# Patient Record
Sex: MALE
[De-identification: names, ages, dates, MRNs, and addresses within clinical notes are randomized per-mention and may not be internally consistent; named-entity substitution may affect disease eponyms.]

---

## 2018-11-29 ENCOUNTER — HOSPITAL ENCOUNTER (EMERGENCY)
Dept: HOSPITAL 42 - ED | Age: 23
Discharge: HOME | End: 2018-11-29
Payer: MEDICAID

## 2018-11-29 VITALS
OXYGEN SATURATION: 100 % | HEART RATE: 75 BPM | DIASTOLIC BLOOD PRESSURE: 78 MMHG | SYSTOLIC BLOOD PRESSURE: 131 MMHG | TEMPERATURE: 99.8 F

## 2018-11-29 VITALS — BODY MASS INDEX: 26.7 KG/M2

## 2018-11-29 VITALS — RESPIRATION RATE: 18 BRPM

## 2018-11-29 DIAGNOSIS — K52.9: Primary | ICD-10-CM

## 2018-11-29 LAB
ALBUMIN SERPL-MCNC: 4.6 G/DL (ref 3–4.8)
ALBUMIN/GLOB SERPL: 1.1 {RATIO} (ref 1.1–1.8)
ALT SERPL-CCNC: 37 U/L (ref 7–56)
AST SERPL-CCNC: 29 U/L (ref 17–59)
BASE EXCESS BLDV CALC-SCNC: 5.1 MMOL/L (ref 0–2)
BUN SERPL-MCNC: 15 MG/DL (ref 7–21)
CALCIUM SERPL-MCNC: 9.4 MG/DL (ref 8.4–10.5)
ERYTHROCYTE [DISTWIDTH] IN BLOOD BY AUTOMATED COUNT: 12.6 % (ref 11.5–14.5)
GFR NON-AFRICAN AMERICAN: > 60
HGB BLD-MCNC: 14.9 G/DL (ref 14–18)
LIPASE SERPL-CCNC: 30 U/L (ref 23–300)
MCH RBC QN AUTO: 28.1 PG (ref 25–35)
MCHC RBC AUTO-ENTMCNC: 35 G/DL (ref 31–37)
MCV RBC AUTO: 80.2 FL (ref 80–105)
PH BLDV: 7.42 [PH] (ref 7.32–7.43)
PLATELET # BLD: 254 10^3/UL (ref 120–450)
PMV BLD AUTO: 9 FL (ref 7–11)
RBC # BLD AUTO: 5.31 10^6/UL (ref 3.5–6.1)
VENOUS BLOOD FIO2: 21 %
VENOUS BLOOD GAS PCO2: 47 (ref 40–60)
VENOUS BLOOD GAS PO2: 38 MM/HG (ref 30–55)
WBC # BLD AUTO: 5.8 10^3/UL (ref 4.5–11)

## 2018-11-29 PROCEDURE — 80053 COMPREHEN METABOLIC PANEL: CPT

## 2018-11-29 PROCEDURE — 85027 COMPLETE CBC AUTOMATED: CPT

## 2018-11-29 PROCEDURE — 99284 EMERGENCY DEPT VISIT MOD MDM: CPT

## 2018-11-29 PROCEDURE — 96374 THER/PROPH/DIAG INJ IV PUSH: CPT

## 2018-11-29 PROCEDURE — 83690 ASSAY OF LIPASE: CPT

## 2018-11-29 PROCEDURE — 82803 BLOOD GASES ANY COMBINATION: CPT

## 2018-11-29 NOTE — ED PDOC
Arrival/HPI





- General


Chief Complaint: GI Problem


Time Seen by Provider: 11/29/18 02:07


Historian: Patient





- History of Present Illness


Narrative History of Present Illness (Text): 


11/29/18 02:18


Eleazar Evans is a 22 year old male who presents to the Emergency department 

complaining of nausea and vomiting. Patient states he has been experiencing 

multiple episodes of diarrhea for the past 2 days with nausea and 4 episodes of 

vomiting today. Patient reports some associated abdominal cramping and fever at 

home. Patient states he took medication at home, but then vomited it up. Patient

notes he is currently on Augmentin for strep throat. Patient denies any chest 

pain, shortness of breath, urinary symptoms, back pain, neck pain, headache, 

dizziness, or any other complaints.





Symptom Onset: Gradual


Symptom Course: Unchanged


Activities at Onset: Light


Context: Home





Past Medical History





- Provider Review


Nursing Documentation Reviewed: Yes





- Cardiac


Other/Comment: hx of "irregular heart beat"





- Pulmonary


Other/Comment: strep throat





- Neurological


Hx Neurological Disorder: No





- HEENT


Hx HEENT Disorder: No


Other/Comment: hx lasik sx





- Renal


Hx Renal Disorder: No





- Endocrine/Metabolic


Hx Endocrine Disorders: No





- Hematological/Oncological


Hx Blood Disorders: No





- Integumentary


Hx Dermatological Disorder: No





- Musculoskeletal/Rheumatological


Hx Musculoskeletal Disorders: No





- Gastrointestinal


Hx Gastrointestinal Disorders: No





- Genitourinary/Gynecological


Hx Genitourinary Disorders: No





- Psychiatric


Hx Psychophysiologic Disorder: No


Hx Substance Use: No





- Surgical History


Other/Comment: lasik sx





- Anesthesia


Hx Anesthesia: No


Hx Anesthesia Reactions: No


Hx Malignant Hyperthermia: No





Family/Social History





- Physician Review


Nursing Documentation Reviewed: Yes


Family/Social History: Unknown Family HX


Smoking Status: Never Smoked


Hx Alcohol Use: No


Hx Substance Use: No





Allergies/Home Meds


Allergies/Adverse Reactions: 


Allergies





No Known Allergies Allergy (Verified 11/29/18 02:12)


   








Home Medications: 


                                    Home Meds











 Medication  Instructions  Recorded  Confirmed


 


Amoxicillin/Potassium Clav 1 tab PO DAILY 11/29/18 11/29/18





[Amox-Clav 875-125 mg Tablet]   














Review of Systems





- Physician Review


All systems were reviewed & negative as marked: Yes





- Review of Systems


Constitutional: Fevers


Eyes: Normal


ENT: Normal


Respiratory: Normal.  absent: SOB, Cough


Cardiovascular: Normal.  absent: Chest Pain


Gastrointestinal: Diarrhea, Nausea, Vomiting


Genitourinary Male: Normal.  absent: Dysuria, Frequency, Hematuria, Urinary 

Output Changes


Musculoskeletal: Normal.  absent: Back Pain, Neck Pain


Skin: Normal.  absent: Rash


Neurological: Normal.  absent: Headache, Dizziness


Endocrine: Normal


Hemo/Lymphatic: Normal


Psychiatric: Normal





Physical Exam


Vital Signs Reviewed: Yes





Vital Signs











  Temp Pulse Resp BP Pulse Ox


 


 11/29/18 02:03  102 F H  111 H  18  117/61  97











Temperature: Febrile


Blood Pressure: Normal


Pulse: Regular


Respiratory Rate: Normal


Appearance: Positive for: Well-Appearing, Non-Toxic, Comfortable


Pain Distress: None


Mental Status: Positive for: Alert and Oriented X 3





- Systems Exam


Head: Present: Atraumatic, Normocephalic


Pupils: Present: PERRL


Extroacular Muscles: Present: EOMI


Conjunctiva: Present: Normal


Ears: Present: Normal, NORMAL TM, Normal Canal.  No: Erythema, TM Bulging, TM 

Perf


Mouth: Present: Moist Mucous Membranes


Pharnyx: Present: ERYTHEMA (Minimal erythema to posterior pharynx).  No: 

EXUDATE, TONSILS ENLARGED, Peritonsilar Swelling, Uvular Deviation, 

Muffled/Hoarse Voice, Strider, Soft Palate/Uvular Edema


Nose (External): Present: Atraumatic


Nose (Internal): Present: Normal Inspection


Neck: Present: Normal Range of Motion.  No: Meningeal Signs, MIDLINE TENDERNESS,

Paraspinal Tenderness


Respiratory/Chest: Present: Clear to Auscultation, Good Air Exchange.  No: 

Respiratory Distress, Accessory Muscle Use


Cardiovascular: Present: Regular Rate and Rhythm, Normal S1, S2.  No: Murmurs


Abdomen: No: Tenderness, Distention, Peritoneal Signs


Back: Present: Normal Inspection.  No: CVA Tenderness, Midline Tenderness, 

Paraspinal Tenderness


Upper Extremity: Present: Normal Inspection.  No: Cyanosis, Edema


Lower Extremity: Present: Normal Inspection.  No: Edema


Neurological: Present: GCS=15, CN II-XII Intact, Speech Normal


Skin: Present: Warm, Dry, Normal Color.  No: Rashes


Psychiatric: Present: Alert, Oriented x 3, Normal Insight, Normal Concentration





Medical Decision Making


ED Course and Treatment: 


11/29/18 02:18


Impression:


22 year old male complaining of nausea, vomiting, diarrhea, abdominal cramping, 

and fever.





Plan:


-- Labs, VBG


-- IV fluids


-- Zofran


-- Tylenol


-- Reassess and disposition





Progress Notes:








- PA / NP / Resident Statement


MD/ has reviewed & agrees with the documentation as recorded.


MD/ has examined the patient and agrees with the treatment plan.





- Scribe Statement


The provider has reviewed the documentation as recorded by the Theo Wagoner





Provider Scribe Attestation:


All medical record entries made by the Scribe were at my direction and 

personally dictated by me. I have reviewed the chart and agree that the record 

accurately reflects my personal performance of the history, physical exam, 

medical decision making, and the department course for this patient. I have also

personally directed, reviewed, and agree with the discharge instructions and 

disposition.








Disposition/Present on Arrival





- Present on Arrival


Any Indicators Present on Arrival: No


History of DVT/PE: No


History of Uncontrolled Diabetes: No


Urinary Catheter: No


History of Decub. Ulcer: No


History Surgical Site Infection Following: None





- Disposition


Have Diagnosis and Disposition been Completed?: Yes


Diagnosis: 


 Gastroenteritis





Disposition: HOME/ ROUTINE


Disposition Time: 06:02


Patient Plan: Discharge


Condition: GOOD


Discharge Instructions (ExitCare):  Gastroenteritis (ED)


Additional Instructions: 


Drink frequent small amounts of liquids/may add Gatorade/advance to bland diet 

as tolerated/take meds as prescribed/follow up with your doctor


Prescriptions: 


Ondansetron ODT [Zofran ODT] 4 mg PO Q6 PRN #12 odt


 PRN Reason: Nausea/Vomiting


Referrals: 


Kateryna Grigsby MD [Primary Care Provider] - Follow up with primary


Forms:  [employer] (English), WORK NOTE

## 2019-02-22 ENCOUNTER — HOSPITAL ENCOUNTER (EMERGENCY)
Dept: HOSPITAL 42 - ED | Age: 24
Discharge: HOME | End: 2019-02-22
Payer: MEDICAID

## 2019-02-22 VITALS
OXYGEN SATURATION: 99 % | HEART RATE: 122 BPM | SYSTOLIC BLOOD PRESSURE: 106 MMHG | RESPIRATION RATE: 20 BRPM | DIASTOLIC BLOOD PRESSURE: 64 MMHG

## 2019-02-22 VITALS — TEMPERATURE: 100.8 F

## 2019-02-22 VITALS — BODY MASS INDEX: 26.7 KG/M2

## 2019-02-22 DIAGNOSIS — E87.6: Primary | ICD-10-CM

## 2019-02-22 DIAGNOSIS — R50.9: ICD-10-CM

## 2019-02-22 DIAGNOSIS — B34.9: ICD-10-CM

## 2019-02-22 DIAGNOSIS — K52.9: ICD-10-CM

## 2019-02-22 DIAGNOSIS — R00.0: ICD-10-CM

## 2019-02-22 DIAGNOSIS — R11.2: ICD-10-CM

## 2019-02-22 LAB
ALBUMIN SERPL-MCNC: 4.7 G/DL (ref 3–4.8)
ALBUMIN/GLOB SERPL: 1.2 {RATIO} (ref 1.1–1.8)
ALT SERPL-CCNC: 27 U/L (ref 7–56)
APPEARANCE UR: CLEAR
AST SERPL-CCNC: 34 U/L (ref 17–59)
BACTERIA #/AREA URNS HPF: (no result) /HPF
BASOPHILS # BLD AUTO: 0 K/MM3 (ref 0–2)
BASOPHILS NFR BLD: 0 % (ref 0–3)
BILIRUB UR-MCNC: NEGATIVE MG/DL
BUN SERPL-MCNC: 20 MG/DL (ref 7–21)
CALCIUM SERPL-MCNC: 9.4 MG/DL (ref 8.4–10.5)
COLOR UR: YELLOW
EOSINOPHIL # BLD: 0.1 10*3/UL (ref 0–0.7)
EOSINOPHIL NFR BLD: 0.7 % (ref 1.5–5)
EPI CELLS #/AREA URNS HPF: (no result) /HPF (ref 0–5)
ERYTHROCYTE [DISTWIDTH] IN BLOOD BY AUTOMATED COUNT: 12.7 % (ref 11.5–14.5)
GFR NON-AFRICAN AMERICAN: > 60
GLUCOSE UR STRIP-MCNC: NEGATIVE MG/DL
HGB BLD-MCNC: 15.3 G/DL (ref 14–18)
LEUKOCYTE ESTERASE UR-ACNC: NEGATIVE LEU/UL
LIPASE SERPL-CCNC: 31 U/L (ref 23–300)
LYMPHOCYTES # BLD: 1.1 10*3/UL (ref 1.2–3.4)
LYMPHOCYTES NFR BLD AUTO: 14.8 % (ref 22–35)
MCH RBC QN AUTO: 28 PG (ref 25–35)
MCHC RBC AUTO-ENTMCNC: 34.5 G/DL (ref 31–37)
MCV RBC AUTO: 81.2 FL (ref 80–105)
MONOCYTES # BLD AUTO: 0.3 10*3/UL (ref 0.1–0.6)
MONOCYTES NFR BLD: 4 % (ref 1–6)
PH UR STRIP: 7 [PH] (ref 4.7–8)
PLATELET # BLD: 244 10^3/UL (ref 120–450)
PMV BLD AUTO: 9.8 FL (ref 7–11)
PROT UR STRIP-MCNC: (no result) MG/DL
RBC # BLD AUTO: 5.47 10^6/UL (ref 3.5–6.1)
RBC # UR STRIP: NEGATIVE /UL
RBC #/AREA URNS HPF: (no result) /HPF (ref 0–2)
SP GR UR STRIP: 1.02 (ref 1–1.03)
UROBILINOGEN UR STRIP-ACNC: 1 E.U./DL
WBC # BLD AUTO: 7.5 10^3/UL (ref 4.5–11)

## 2019-02-22 PROCEDURE — 87804 INFLUENZA ASSAY W/OPTIC: CPT

## 2019-02-22 PROCEDURE — 96376 TX/PRO/DX INJ SAME DRUG ADON: CPT

## 2019-02-22 PROCEDURE — 83735 ASSAY OF MAGNESIUM: CPT

## 2019-02-22 PROCEDURE — 99284 EMERGENCY DEPT VISIT MOD MDM: CPT

## 2019-02-22 PROCEDURE — 96375 TX/PRO/DX INJ NEW DRUG ADDON: CPT

## 2019-02-22 PROCEDURE — 74176 CT ABD & PELVIS W/O CONTRAST: CPT

## 2019-02-22 PROCEDURE — 83690 ASSAY OF LIPASE: CPT

## 2019-02-22 PROCEDURE — 85025 COMPLETE CBC W/AUTO DIFF WBC: CPT

## 2019-02-22 PROCEDURE — 96374 THER/PROPH/DIAG INJ IV PUSH: CPT

## 2019-02-22 PROCEDURE — 80053 COMPREHEN METABOLIC PANEL: CPT

## 2019-02-22 PROCEDURE — 81001 URINALYSIS AUTO W/SCOPE: CPT

## 2019-02-22 NOTE — ED PDOC
Arrival/HPI





- General


Chief Complaint: GI Problem


Time Seen by Provider: 02/22/19 18:30


Historian: Patient, Parent





- History of Present Illness


Time/Duration: Other (Last night)


Symptom Onset: Gradual


Symptom Course: Unchanged


Severity Level: Moderate


Activities at Onset: Rest


Associated Symptoms (Text): 





02/22/19 18:38


Patient complains of abdominal pain nausea vomiting and fever since last night. 

No diarrhea.  His abdominal pain has since resolved.  He has not been able to 

tolerate any p.o.  No travel or exposure.  No injury or trauma.  He has had 

multiple similar previous episodes.  Here he appears dry dehydrated and 

uncomfortable..





Past Medical History





- Cardiac


Hx Cardiac Disorders: Yes


Hx Heart Murmur: Yes





- Pulmonary


Other/Comment: strep throat





- Neurological


Hx Neurological Disorder: No





- HEENT


Hx HEENT Disorder: No


Other/Comment: hx lasik sx





- Renal


Hx Renal Disorder: No





- Endocrine/Metabolic


Hx Endocrine Disorders: No





- Hematological/Oncological


Hx Blood Disorders: No





- Integumentary


Hx Dermatological Disorder: No





- Musculoskeletal/Rheumatological


Hx Musculoskeletal Disorders: No





- Gastrointestinal


Hx Gastrointestinal Disorders: No





- Genitourinary/Gynecological


Hx Genitourinary Disorders: No





- Psychiatric


Hx Psychophysiologic Disorder: No


Hx Substance Use: No





- Surgical History


Hx Eye Surgery: Yes





- Anesthesia


Hx Anesthesia: No


Hx Anesthesia Reactions: No


Hx Malignant Hyperthermia: No





Family/Social History





- Physician Review


Nursing Documentation Reviewed: Yes


Family/Social History: Unknown Family HX


Smoking Status: Never Smoked


Hx Alcohol Use: No


Hx Substance Use: No





Allergies/Home Meds


Allergies/Adverse Reactions: 


Allergies





No Known Allergies Allergy (Verified 02/22/19 18:23)


   








Home Medications: 


                                    Home Meds











 Medication  Instructions  Recorded  Confirmed


 


Ibuprofen [Motrin Tab] 500 mg PO PRN PRN 02/22/19 02/22/19














Review of Systems





- Physician Review


All systems were reviewed & negative as marked: Yes





- Review of Systems


Constitutional: Fatigue, Fevers


Respiratory: absent: SOB, Cough, Sputum, Wheezing


Cardiovascular: absent: Chest Pain, Palpitations, Syncope


Gastrointestinal: Abdominal Pain, Nausea, Vomiting, Anorexia.  absent: Constipat

ion, Diarrhea


Genitourinary Male: absent: Dysuria, Frequency, Hematuria


Neurological: absent: Headache, Dizziness, Focal Weakness





Physical Exam





Vital Signs











  Temp Pulse Resp BP Pulse Ox


 


 02/22/19 18:25  101.1 F H  122 H  20  106/64  99











Temperature: Febrile


Blood Pressure: Normal


Pulse: Tachycardic


Respiratory Rate: Normal


Appearance: Positive for: Well-Appearing, Non-Toxic, Comfortable, Uncomfortable


Pain Distress: None


Mental Status: Positive for: Alert and Oriented X 3





- Systems Exam


Head: Present: Atraumatic, Normocephalic


Pupils: Present: PERRL


Extroacular Muscles: Present: EOMI


Conjunctiva: Present: Normal


Ears: Present: NORMAL TM, Normal Canal.  No: Erythema, TM Bulging


Mouth: Present: Dry


Pharnyx: No: ERYTHEMA, EXUDATE, TONSILS ENLARGED


Neck: Present: Normal Range of Motion


Respiratory/Chest: Present: Clear to Auscultation, Good Air Exchange.  No: 

Respiratory Distress, Accessory Muscle Use


Cardiovascular: Present: Normal S1, S2, Tachycardic.  No: Murmurs


Abdomen: No: Tenderness, Distention, Peritoneal Signs, Rebound, Guarding


Back: Present: Normal Inspection.  No: CVA Tenderness, Midline Tenderness, 

Paraspinal Tenderness


Upper Extremity: Present: Normal Inspection.  No: Cyanosis, Edema


Lower Extremity: Present: Normal Inspection.  No: Edema


Neurological: Present: GCS=15, CN II-XII Intact, Speech Normal, Motor Func 

Grossly Intact


Skin: Present: Warm, Dry, Normal Color.  No: Rashes


Psychiatric: Present: Alert, Oriented x 3, Normal Insight, Normal Concentration





Medical Decision Making


ED Course and Treatment: 

















EXAM: CT Abdomen and Pelvis without IV contrast 


Electronically signed on Feb 22, 2019 8:02:12 PM EST by:


Raul Quispe M.D.


IMPRESSION: 


1.  Findings suspicious for some acute/chronic cholecystitis.  Consideration 

could be given to correlation with gallbladder ultrasound evaluation. 


2.  An age indeterminate oblique fracture is seen in the anterolateral aspect of

the right acetabulum.











02/22/19 20:41


Blood work is unremarkable.  Urinalysis is unremarkable.  Patient has been able 

to tolerate p.o. potassium in the emergency department.  His CT scan is 

suspicious for acute on chronic cholecystitis.  His liver enzymes and bilirubin 

are normal.  He will be discharged home accompanied by his mother to follow-up 

with PMD for an outpatient gallbladder ultrasound.





- RAD Interpretation


Radiology Orders: 











02/22/19 18:35


ABD & PELVIS W/O PO OR IV CONT [CT] Stat 














- Medication Orders


Current Medication Orders: 











Acetaminophen (Tylenol 650 Mg Supp)  650 mg RC STAT STA


   Stop: 02/22/19 18:37


Sodium Chloride (Sodium Chloride 0.9%)  1,000 mls @ 1,000 mls/hr IV .Q1H STA


   Stop: 02/22/19 19:34


Ondansetron HCl (Zofran Inj)  4 mg IVP STAT STA


   Stop: 02/22/19 18:36


Pantoprazole Sodium (Protonix Inj)  40 mg IVP STAT STA


   Stop: 02/22/19 18:36











Disposition/Present on Arrival





- Present on Arrival


Any Indicators Present on Arrival: No


History of DVT/PE: No


History of Uncontrolled Diabetes: No


Urinary Catheter: No


History of Decub. Ulcer: No


History Surgical Site Infection Following: None





- Disposition


Have Diagnosis and Disposition been Completed?: Yes


Diagnosis: 


 Hypokalemia, Abdominal pain, Fever, Nausea and vomiting, Gastroenteritis, 

Tachycardia, Viral illness





Disposition: HOME/ ROUTINE


Disposition Time: 20:44


Patient Plan: Discharge


Condition: IMPROVED


Discharge Instructions (ExitCare):  Viral Gastroenteritis, Hypokalemia, 

Tachycardia, Nausea and Vomiting, Adult (DC), Viral Gastroenteritis, Adult (DC),

 Fever, Adult (DC)


Additional Instructions: 


Clear liquids for 24 hours.  Symptomatic treatment.  Tylenol or Advil as 

directed on bottle as needed.  Follow-up with PMD.  Follow-up in the ER as 

needed.


Prescriptions: 


Ondansetron ODT [Zofran ODT] 4 mg PO Q6 #20 odt


Referrals: 


Kateryna Grigsby MD [Primary Care Provider] - Follow up with primary


Forms:  [email or web address] (English), WORK NOTE

## 2019-02-23 NOTE — CT
Date of service: 



PROCEDURE:  CT Abdomen and Pelvis without intravenous contrast



HISTORY:

Abdominal pain, nausea and vomiting 



COMPARISON:

None.



TECHNIQUE:

CT scan of the abdomen and pelvis was performed without 

administration of intravenous contrast.  Oral contrast was not 

administered.  Coronal and sagittal reformatted images were obtained. 



Radiation dose:



Total exam DLP = 343.02 mGy-cm.



This CT exam was performed using one or more of the following dose 

reduction techniques: Automated exposure control, adjustment of the 

mA and/or kV according to patient size, and/or use of iterative 

reconstruction technique.



FINDINGS:



LOWER THORAX:

The visualized lungs are clear. 



LIVER:

Normal in size.  No gross lesion or ductal dilatation. 



GALLBLADDER AND BILE DUCTS:

Well distended. No calcified gallstones. No common bile duct 

dilatation. 



PANCREAS:

Normal in size. No gross lesion or ductal dilatation.



SPLEEN:

Normal in size. 



ADRENALS:

Normal in size. No discrete nodule. 



KIDNEYS AND URETERS:

Both kidneys are normal in size. No hydronephrosis or 

nephrolithiasis. 



VASCULATURE:

Normal in caliber. No aortic aneurysm. No aortic atherosclerotic 

calcification or mural plaque present.



BOWEL:

Evaluation of the bowel is limited in the absence of oral contrast.  

The small bowel loops are normal in caliber. The colon is normal in 

size. No bowel dilatation or wall thickening. No bowel obstruction. 



APPENDIX:

Normal appendix. 



PERITONEUM:

No free fluid. No free air. 



LYMPH NODES:

No enlarged lymph nodes. 



BLADDER:

Decompressed. 



REPRODUCTIVE:

The prostate gland is normal in size. 



BONES:

No acute fracture.  There is sharp lucency in the right posterior 

superior acetabulum and iliac bone which may represent an old 

nonunited fracture deformity or postsurgical changes. 



OTHER FINDINGS:

None.



IMPRESSION:

No acute abdominal or pelvic abnormality.



A preliminary report was provided by [facility].. 



The final report is tagged to the PA review folder.

## 2020-05-11 ENCOUNTER — HOSPITAL ENCOUNTER (EMERGENCY)
Age: 25
Discharge: HOME | End: 2020-05-11
Attending: EMERGENCY MEDICINE
Payer: SELF-PAY

## 2020-05-11 ENCOUNTER — APPOINTMENT (OUTPATIENT)
Dept: CT IMAGING | Age: 25
End: 2020-05-11
Attending: EMERGENCY MEDICINE
Payer: SELF-PAY

## 2020-05-11 VITALS
TEMPERATURE: 98.3 F | HEART RATE: 72 BPM | RESPIRATION RATE: 16 BRPM | HEIGHT: 68 IN | OXYGEN SATURATION: 98 % | BODY MASS INDEX: 29.5 KG/M2 | DIASTOLIC BLOOD PRESSURE: 75 MMHG | SYSTOLIC BLOOD PRESSURE: 110 MMHG | WEIGHT: 194.67 LBS

## 2020-05-11 DIAGNOSIS — K63.89 EPIPLOIC APPENDAGITIS: Primary | ICD-10-CM

## 2020-05-11 LAB
ALBUMIN SERPL-MCNC: 3.9 G/DL (ref 3.5–5)
ALBUMIN/GLOB SERPL: 0.9 {RATIO} (ref 1.1–2.2)
ALP SERPL-CCNC: 67 U/L (ref 45–117)
ALT SERPL-CCNC: 68 U/L (ref 12–78)
ANION GAP SERPL CALC-SCNC: 10 MMOL/L (ref 5–15)
APPEARANCE UR: CLEAR
AST SERPL-CCNC: 27 U/L (ref 15–37)
BACTERIA URNS QL MICRO: NEGATIVE /HPF
BASOPHILS # BLD: 0 K/UL (ref 0–0.1)
BASOPHILS NFR BLD: 0 % (ref 0–1)
BILIRUB SERPL-MCNC: 0.5 MG/DL (ref 0.2–1)
BILIRUB UR QL: NEGATIVE
BUN SERPL-MCNC: 17 MG/DL (ref 6–20)
BUN/CREAT SERPL: 19 (ref 12–20)
CALCIUM SERPL-MCNC: 9.5 MG/DL (ref 8.5–10.1)
CHLORIDE SERPL-SCNC: 102 MMOL/L (ref 97–108)
CO2 SERPL-SCNC: 29 MMOL/L (ref 21–32)
COLOR UR: ABNORMAL
CREAT SERPL-MCNC: 0.9 MG/DL (ref 0.7–1.3)
DIFFERENTIAL METHOD BLD: NORMAL
EOSINOPHIL # BLD: 0.3 K/UL (ref 0–0.4)
EOSINOPHIL NFR BLD: 5 % (ref 0–7)
EPITH CASTS URNS QL MICRO: ABNORMAL /LPF
ERYTHROCYTE [DISTWIDTH] IN BLOOD BY AUTOMATED COUNT: 12.6 % (ref 11.5–14.5)
GLOBULIN SER CALC-MCNC: 4.3 G/DL (ref 2–4)
GLUCOSE SERPL-MCNC: 92 MG/DL (ref 65–100)
GLUCOSE UR STRIP.AUTO-MCNC: NEGATIVE MG/DL
HCT VFR BLD AUTO: 44.7 % (ref 36.6–50.3)
HGB BLD-MCNC: 14.6 G/DL (ref 12.1–17)
HGB UR QL STRIP: NEGATIVE
IMM GRANULOCYTES # BLD AUTO: 0 K/UL (ref 0–0.04)
IMM GRANULOCYTES NFR BLD AUTO: 0 % (ref 0–0.5)
KETONES UR QL STRIP.AUTO: NEGATIVE MG/DL
LEUKOCYTE ESTERASE UR QL STRIP.AUTO: NEGATIVE
LIPASE SERPL-CCNC: 56 U/L (ref 73–393)
LYMPHOCYTES # BLD: 2.2 K/UL (ref 0.8–3.5)
LYMPHOCYTES NFR BLD: 38 % (ref 12–49)
MCH RBC QN AUTO: 26.5 PG (ref 26–34)
MCHC RBC AUTO-ENTMCNC: 32.7 G/DL (ref 30–36.5)
MCV RBC AUTO: 81.3 FL (ref 80–99)
MONOCYTES # BLD: 0.5 K/UL (ref 0–1)
MONOCYTES NFR BLD: 8 % (ref 5–13)
NEUTS SEG # BLD: 2.9 K/UL (ref 1.8–8)
NEUTS SEG NFR BLD: 49 % (ref 32–75)
NITRITE UR QL STRIP.AUTO: NEGATIVE
NRBC # BLD: 0 K/UL (ref 0–0.01)
NRBC BLD-RTO: 0 PER 100 WBC
PH UR STRIP: 7.5 [PH] (ref 5–8)
PLATELET # BLD AUTO: 296 K/UL (ref 150–400)
PMV BLD AUTO: 9.5 FL (ref 8.9–12.9)
POTASSIUM SERPL-SCNC: 3.1 MMOL/L (ref 3.5–5.1)
PROT SERPL-MCNC: 8.2 G/DL (ref 6.4–8.2)
PROT UR STRIP-MCNC: NEGATIVE MG/DL
RBC # BLD AUTO: 5.5 M/UL (ref 4.1–5.7)
RBC #/AREA URNS HPF: ABNORMAL /HPF (ref 0–5)
SODIUM SERPL-SCNC: 141 MMOL/L (ref 136–145)
SP GR UR REFRACTOMETRY: >1.03 (ref 1–1.03)
UROBILINOGEN UR QL STRIP.AUTO: 0.2 EU/DL (ref 0.2–1)
WBC # BLD AUTO: 5.9 K/UL (ref 4.1–11.1)
WBC URNS QL MICRO: ABNORMAL /HPF (ref 0–4)

## 2020-05-11 PROCEDURE — 83690 ASSAY OF LIPASE: CPT

## 2020-05-11 PROCEDURE — 80053 COMPREHEN METABOLIC PANEL: CPT

## 2020-05-11 PROCEDURE — 81001 URINALYSIS AUTO W/SCOPE: CPT

## 2020-05-11 PROCEDURE — 74177 CT ABD & PELVIS W/CONTRAST: CPT

## 2020-05-11 PROCEDURE — 74011636320 HC RX REV CODE- 636/320: Performed by: EMERGENCY MEDICINE

## 2020-05-11 PROCEDURE — 99284 EMERGENCY DEPT VISIT MOD MDM: CPT

## 2020-05-11 PROCEDURE — 36415 COLL VENOUS BLD VENIPUNCTURE: CPT

## 2020-05-11 PROCEDURE — 85025 COMPLETE CBC W/AUTO DIFF WBC: CPT

## 2020-05-11 PROCEDURE — 74011000258 HC RX REV CODE- 258: Performed by: EMERGENCY MEDICINE

## 2020-05-11 RX ORDER — ACETAMINOPHEN 500 MG
1000 TABLET ORAL 3 TIMES DAILY
Qty: 24 TAB | Refills: 0 | Status: SHIPPED | OUTPATIENT
Start: 2020-05-11 | End: 2020-05-15

## 2020-05-11 RX ORDER — IBUPROFEN 800 MG/1
800 TABLET ORAL
Qty: 20 TAB | Refills: 0 | Status: SHIPPED | OUTPATIENT
Start: 2020-05-11 | End: 2020-05-18

## 2020-05-11 RX ORDER — SODIUM CHLORIDE 9 MG/ML
10 INJECTION INTRAMUSCULAR; INTRAVENOUS; SUBCUTANEOUS ONCE
Status: COMPLETED | OUTPATIENT
Start: 2020-05-11 | End: 2020-05-11

## 2020-05-11 RX ORDER — SODIUM CHLORIDE 9 MG/ML
50 INJECTION, SOLUTION INTRAVENOUS
Status: COMPLETED | OUTPATIENT
Start: 2020-05-11 | End: 2020-05-11

## 2020-05-11 RX ADMIN — SODIUM CHLORIDE 10 ML: 9 INJECTION INTRAMUSCULAR; INTRAVENOUS; SUBCUTANEOUS at 18:20

## 2020-05-11 RX ADMIN — SODIUM CHLORIDE 50 ML: 900 INJECTION, SOLUTION INTRAVENOUS at 18:20

## 2020-05-11 RX ADMIN — IOPAMIDOL 100 ML: 755 INJECTION, SOLUTION INTRAVENOUS at 18:20

## 2020-05-11 NOTE — ED PROVIDER NOTES
12-year-old male with no significant past medical history or abdominal surgical history presents to the emergency department noting a 2-day history of predominantly left lower quadrant and suprapubic abdominal pain that he states is worse with movement and palpation as well as when flexing his abdominal muscles to urinate. He denies any sit ups or change in his physical activity level. Denies any trauma to the area. He denies any dysuria or hematuria, no history of prior kidney stones. No fever, chills, nausea, vomiting, constipation, but he does state that he has had occasional watery, nonbloody diarrhea. We states that his pain is 0 when at rest but when he moves or palpation it is about a 6/10. He has not taken anything for pain. He denies any groin bulges, redness, swelling, penile discharge or pain in his groin area. History reviewed. No pertinent past medical history. Past Surgical History:   Procedure Laterality Date    HX REFRACTIVE SURGERY           History reviewed. No pertinent family history.     Social History     Socioeconomic History    Marital status: SINGLE     Spouse name: Not on file    Number of children: Not on file    Years of education: Not on file    Highest education level: Not on file   Occupational History    Not on file   Social Needs    Financial resource strain: Not on file    Food insecurity     Worry: Not on file     Inability: Not on file    Transportation needs     Medical: Not on file     Non-medical: Not on file   Tobacco Use    Smoking status: Never Smoker   Substance and Sexual Activity    Alcohol use: Not on file     Comment: social    Drug use: Never    Sexual activity: Not on file   Lifestyle    Physical activity     Days per week: Not on file     Minutes per session: Not on file    Stress: Not on file   Relationships    Social connections     Talks on phone: Not on file     Gets together: Not on file     Attends Rastafarian service: Not on file     Active member of club or organization: Not on file     Attends meetings of clubs or organizations: Not on file     Relationship status: Not on file    Intimate partner violence     Fear of current or ex partner: Not on file     Emotionally abused: Not on file     Physically abused: Not on file     Forced sexual activity: Not on file   Other Topics Concern    Not on file   Social History Narrative    Not on file         ALLERGIES: Patient has no known allergies. Review of Systems   Constitutional: Negative for activity change, appetite change, chills and fever. HENT: Negative for congestion, rhinorrhea, sinus pain, sneezing and sore throat. Eyes: Negative for photophobia and visual disturbance. Respiratory: Negative for cough and shortness of breath. Cardiovascular: Negative for chest pain. Gastrointestinal: Positive for abdominal pain and diarrhea. Negative for blood in stool, constipation, nausea and vomiting. Genitourinary: Negative for difficulty urinating, dysuria, flank pain, hematuria, penile pain and testicular pain. Musculoskeletal: Negative for arthralgias, back pain, myalgias and neck pain. Skin: Negative for rash and wound. Neurological: Negative for syncope, weakness, light-headedness, numbness and headaches. Psychiatric/Behavioral: Negative for self-injury and suicidal ideas. All other systems reviewed and are negative. Vitals:    05/11/20 1637   BP: (!) 123/94   Pulse: 80   Resp: 18   Temp: 98.4 °F (36.9 °C)   SpO2: 97%   Weight: 88.3 kg (194 lb 10.7 oz)   Height: 5' 8\" (1.727 m)            Physical Exam  Vitals signs and nursing note reviewed. Constitutional:       General: He is not in acute distress. Appearance: Normal appearance. He is well-developed. He is not diaphoretic. HENT:      Head: Normocephalic and atraumatic. Nose: Nose normal.   Eyes:      Extraocular Movements: Extraocular movements intact.       Conjunctiva/sclera: Conjunctivae normal.      Pupils: Pupils are equal, round, and reactive to light. Neck:      Musculoskeletal: Neck supple. Cardiovascular:      Rate and Rhythm: Normal rate and regular rhythm. Heart sounds: Normal heart sounds. Pulmonary:      Effort: Pulmonary effort is normal.      Breath sounds: Normal breath sounds. Abdominal:      General: There is no distension. Palpations: Abdomen is soft. Tenderness: There is abdominal tenderness in the suprapubic area. There is no right CVA tenderness, left CVA tenderness, guarding or rebound. Positive signs include McBurney's sign (mild), psoas sign (mildly) and obturator sign (mild). Negative signs include Merritt's sign and Rovsing's sign. Musculoskeletal:         General: No tenderness. Skin:     General: Skin is warm and dry. Neurological:      General: No focal deficit present. Mental Status: He is alert and oriented to person, place, and time. Cranial Nerves: No cranial nerve deficit. Sensory: No sensory deficit. Motor: No weakness. Coordination: Coordination normal.          [facility]  ED Course as of May 11 2007   Mon May 11, 2020   1906 7:06 PM  Change of shift. Care of patient taken over from Dr. Dmitri Willoughby; H&P reviewed, bedside handoff complete. Awaiting  CT and UA     [ZD]   2006 . Epiploic appendagitis. Discussed diagnosis and treatment with anti-inflammatories. Discussed the discharge impression and any labs and the results with the patient. Answered any questions and addressed any concerns. Discussed the importance of following up with their primary care provider and/or specialist.  Discussed signs or symptoms that would warrant return back to the ER for further evaluation. The patient is agreeable with discharge. CT ABD PELV W CONT [ZD]      ED Course User Index  [ZD] Ilir Hicks MD   28-year-old male presents with 2 days of lower abdominal pain. Patient is afebrile with vital signs stable no acute distress. Abdominal exam tender suprapubically, as well as left lower quadrant and to a lesser extent right lower quadrant without guarding or peritonitis. Labs returned reassuringly showing no significant abnormalities.     CT abdomen pelvis ordered to further evaluate and while awaiting results his care was signed out to Dr. Mi Powell at 7 PM.  Please see his note for further information regarding the remainder of his care while in the ED.    8:07 PM  See ED course  Procedures

## 2020-05-11 NOTE — ED TRIAGE NOTES
TRIAGE NOTE: Arrives for LEFT groin pain x 2 days. Pain is exacerabated by sharifa muscles and urination.

## 2020-05-12 NOTE — ED NOTES
Discharge instructions reviewed with pt and copy given along with RX by this RN. Patient educated on follow up with PCP if symptoms continue and advised to take antiinflammatory for pain over the next few days. Pt verbalized understanding and is ambulatory from ED in no sign of distress or discomfort.